# Patient Record
Sex: MALE | Race: WHITE | NOT HISPANIC OR LATINO
[De-identification: names, ages, dates, MRNs, and addresses within clinical notes are randomized per-mention and may not be internally consistent; named-entity substitution may affect disease eponyms.]

---

## 2020-10-07 ENCOUNTER — APPOINTMENT (OUTPATIENT)
Dept: INTERNAL MEDICINE | Facility: CLINIC | Age: 35
End: 2020-10-07
Payer: COMMERCIAL

## 2020-10-07 VITALS
HEART RATE: 80 BPM | WEIGHT: 241 LBS | HEIGHT: 68 IN | BODY MASS INDEX: 36.53 KG/M2 | SYSTOLIC BLOOD PRESSURE: 130 MMHG | OXYGEN SATURATION: 97 % | DIASTOLIC BLOOD PRESSURE: 85 MMHG

## 2020-10-07 DIAGNOSIS — Z82.49 FAMILY HISTORY OF ISCHEMIC HEART DISEASE AND OTHER DISEASES OF THE CIRCULATORY SYSTEM: ICD-10-CM

## 2020-10-07 DIAGNOSIS — Z83.3 FAMILY HISTORY OF DIABETES MELLITUS: ICD-10-CM

## 2020-10-07 DIAGNOSIS — Z87.891 PERSONAL HISTORY OF NICOTINE DEPENDENCE: ICD-10-CM

## 2020-10-07 PROCEDURE — 99385 PREV VISIT NEW AGE 18-39: CPT

## 2020-10-07 NOTE — HISTORY OF PRESENT ILLNESS
[FreeTextEntry1] : Routine physical and 35-year-old male [de-identified] : Assessment 35-year-old male with history of obesity. He's been losing weight of late and is now 5 feet 8 weight 239 pounds he previously was over 250. Past medical history none. Surgery none. Meds none. Allergies none. Social history former one third of a pack per day smoker but quit in September of 2019. He denies alcohol use. He works as a . Both parents have hypertension and diabetes. He has 4 siblings alive and well. Review of systems significant for a chronic complaint of right knee pain for more than one year. It is worse going up and down stairs and mostly along the lateral joint line.

## 2020-10-07 NOTE — ASSESSMENT
[FreeTextEntry1] : Patient with a recently had a right knee pain. Patient will be referred to orthopedist. Intensity obesity we discussed diet control and increasing aerobic exercise. Patient also will be going for routine labs and he will call me for the results

## 2020-10-07 NOTE — PHYSICAL EXAM
[No Acute Distress] : no acute distress [Well Developed] : well developed [Well-Appearing] : well-appearing [Normal Sclera/Conjunctiva] : normal sclera/conjunctiva [PERRL] : pupils equal round and reactive to light [EOMI] : extraocular movements intact [Normal Outer Ear/Nose] : the outer ears and nose were normal in appearance [Normal Oropharynx] : the oropharynx was normal [No JVD] : no jugular venous distention [No Lymphadenopathy] : no lymphadenopathy [Supple] : supple [Thyroid Normal, No Nodules] : the thyroid was normal and there were no nodules present [No Respiratory Distress] : no respiratory distress  [No Accessory Muscle Use] : no accessory muscle use [Clear to Auscultation] : lungs were clear to auscultation bilaterally [Normal Rate] : normal rate  [Regular Rhythm] : with a regular rhythm [Normal S1, S2] : normal S1 and S2 [No Murmur] : no murmur heard [No Carotid Bruits] : no carotid bruits [No Abdominal Bruit] : a ~M bruit was not heard ~T in the abdomen [No Varicosities] : no varicosities [Pedal Pulses Present] : the pedal pulses are present [No Edema] : there was no peripheral edema [No Palpable Aorta] : no palpable aorta [No Extremity Clubbing/Cyanosis] : no extremity clubbing/cyanosis [Soft] : abdomen soft [Non Tender] : non-tender [Non-distended] : non-distended [No Masses] : no abdominal mass palpated [No HSM] : no HSM [Normal Bowel Sounds] : normal bowel sounds [Normal Posterior Cervical Nodes] : no posterior cervical lymphadenopathy [Normal Anterior Cervical Nodes] : no anterior cervical lymphadenopathy [No CVA Tenderness] : no CVA  tenderness [No Spinal Tenderness] : no spinal tenderness [No Joint Swelling] : no joint swelling [Grossly Normal Strength/Tone] : grossly normal strength/tone [No Rash] : no rash [Coordination Grossly Intact] : coordination grossly intact [No Focal Deficits] : no focal deficits [Normal Gait] : normal gait [Normal Affect] : the affect was normal [Normal Insight/Judgement] : insight and judgment were intact [de-identified] : obese

## 2022-01-14 ENCOUNTER — APPOINTMENT (OUTPATIENT)
Dept: INTERNAL MEDICINE | Facility: CLINIC | Age: 37
End: 2022-01-14
Payer: COMMERCIAL

## 2022-01-14 VITALS
TEMPERATURE: 96.8 F | HEIGHT: 68 IN | HEART RATE: 102 BPM | DIASTOLIC BLOOD PRESSURE: 80 MMHG | SYSTOLIC BLOOD PRESSURE: 130 MMHG | BODY MASS INDEX: 35.61 KG/M2 | WEIGHT: 235 LBS | OXYGEN SATURATION: 98 %

## 2022-01-14 DIAGNOSIS — E66.9 OBESITY, UNSPECIFIED: ICD-10-CM

## 2022-01-14 DIAGNOSIS — Z00.00 ENCOUNTER FOR GENERAL ADULT MEDICAL EXAMINATION W/OUT ABNORMAL FINDINGS: ICD-10-CM

## 2022-01-14 PROCEDURE — 99395 PREV VISIT EST AGE 18-39: CPT
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
## 2025-03-31 ENCOUNTER — EMERGENCY (EMERGENCY)
Facility: HOSPITAL | Age: 40
LOS: 1 days | Discharge: HOME | End: 2025-03-31
Attending: EMERGENCY MEDICINE | Admitting: STUDENT IN AN ORGANIZED HEALTH CARE EDUCATION/TRAINING PROGRAM
Payer: MEDICAID

## 2025-03-31 VITALS
OXYGEN SATURATION: 96 % | DIASTOLIC BLOOD PRESSURE: 77 MMHG | HEART RATE: 80 BPM | SYSTOLIC BLOOD PRESSURE: 117 MMHG | HEIGHT: 68 IN | WEIGHT: 235.01 LBS | RESPIRATION RATE: 18 BRPM | TEMPERATURE: 99 F

## 2025-03-31 LAB
ADD ON TEST-SPECIMEN IN LAB: SIGNIFICANT CHANGE UP
ALBUMIN SERPL ELPH-MCNC: 4.2 G/DL — SIGNIFICANT CHANGE UP (ref 3.3–5)
ALP SERPL-CCNC: 66 U/L — SIGNIFICANT CHANGE UP (ref 40–120)
ALT FLD-CCNC: 47 U/L — HIGH (ref 4–41)
ANION GAP SERPL CALC-SCNC: 13 MMOL/L — SIGNIFICANT CHANGE UP (ref 7–14)
AST SERPL-CCNC: 35 U/L — SIGNIFICANT CHANGE UP (ref 4–40)
BACTERIA # UR AUTO: NEGATIVE /HPF — SIGNIFICANT CHANGE UP
BILIRUB SERPL-MCNC: 0.3 MG/DL — SIGNIFICANT CHANGE UP (ref 0.2–1.2)
BILIRUB UR-MCNC: NEGATIVE — SIGNIFICANT CHANGE UP
BUN SERPL-MCNC: 13 MG/DL — SIGNIFICANT CHANGE UP (ref 7–23)
CALCIUM SERPL-MCNC: 8.7 MG/DL — SIGNIFICANT CHANGE UP (ref 8.4–10.5)
CAST: 0 /LPF — SIGNIFICANT CHANGE UP (ref 0–4)
CHLORIDE SERPL-SCNC: 102 MMOL/L — SIGNIFICANT CHANGE UP (ref 98–107)
CK SERPL-CCNC: 147 U/L — SIGNIFICANT CHANGE UP (ref 30–200)
CO2 SERPL-SCNC: 21 MMOL/L — LOW (ref 22–31)
COLOR SPEC: YELLOW — SIGNIFICANT CHANGE UP
CREAT SERPL-MCNC: 0.88 MG/DL — SIGNIFICANT CHANGE UP (ref 0.5–1.3)
DIFF PNL FLD: NEGATIVE — SIGNIFICANT CHANGE UP
EGFR: 114 ML/MIN/1.73M2 — SIGNIFICANT CHANGE UP
EGFR: 114 ML/MIN/1.73M2 — SIGNIFICANT CHANGE UP
FLUAV AG NPH QL: SIGNIFICANT CHANGE UP
FLUBV AG NPH QL: SIGNIFICANT CHANGE UP
GLUCOSE SERPL-MCNC: 102 MG/DL — HIGH (ref 70–99)
GLUCOSE UR QL: NEGATIVE MG/DL — SIGNIFICANT CHANGE UP
HCT VFR BLD CALC: 40.9 % — SIGNIFICANT CHANGE UP (ref 39–50)
HGB BLD-MCNC: 14.4 G/DL — SIGNIFICANT CHANGE UP (ref 13–17)
KETONES UR-MCNC: ABNORMAL MG/DL
LEUKOCYTE ESTERASE UR-ACNC: NEGATIVE — SIGNIFICANT CHANGE UP
MCHC RBC-ENTMCNC: 35.2 G/DL — SIGNIFICANT CHANGE UP (ref 32–36)
MCV RBC AUTO: 86.8 FL — SIGNIFICANT CHANGE UP (ref 80–100)
NITRITE UR-MCNC: NEGATIVE — SIGNIFICANT CHANGE UP
NRBC # BLD AUTO: 0 K/UL — SIGNIFICANT CHANGE UP (ref 0–0)
NRBC # FLD: 0 K/UL — SIGNIFICANT CHANGE UP (ref 0–0)
NRBC BLD AUTO-RTO: 0 /100 WBCS — SIGNIFICANT CHANGE UP (ref 0–0)
PH UR: 6.5 — SIGNIFICANT CHANGE UP (ref 5–8)
PLATELET # BLD AUTO: 219 K/UL — SIGNIFICANT CHANGE UP (ref 150–400)
POTASSIUM SERPL-MCNC: 4 MMOL/L — SIGNIFICANT CHANGE UP (ref 3.5–5.3)
POTASSIUM SERPL-SCNC: 4 MMOL/L — SIGNIFICANT CHANGE UP (ref 3.5–5.3)
PROT SERPL-MCNC: 7.3 G/DL — SIGNIFICANT CHANGE UP (ref 6–8.3)
PROT UR-MCNC: 30 MG/DL
RBC # BLD: 4.71 M/UL — SIGNIFICANT CHANGE UP (ref 4.2–5.8)
RBC # FLD: 12.1 % — SIGNIFICANT CHANGE UP (ref 10.3–14.5)
RBC CASTS # UR COMP ASSIST: 2 /HPF — SIGNIFICANT CHANGE UP (ref 0–4)
RSV RNA NPH QL NAA+NON-PROBE: SIGNIFICANT CHANGE UP
SARS-COV-2 RNA SPEC QL NAA+PROBE: SIGNIFICANT CHANGE UP
SODIUM SERPL-SCNC: 136 MMOL/L — SIGNIFICANT CHANGE UP (ref 135–145)
SOURCE RESPIRATORY: SIGNIFICANT CHANGE UP
SP GR SPEC: 1.03 — HIGH (ref 1–1.03)
SQUAMOUS # UR AUTO: 0 /HPF — SIGNIFICANT CHANGE UP (ref 0–5)
UROBILINOGEN FLD QL: 1 MG/DL — SIGNIFICANT CHANGE UP (ref 0.2–1)
WBC # FLD AUTO: 5.03 K/UL — SIGNIFICANT CHANGE UP (ref 3.8–10.5)
WBC UR QL: 0 /HPF — SIGNIFICANT CHANGE UP (ref 0–5)

## 2025-03-31 RX ORDER — ACETAMINOPHEN 500 MG/5ML
975 LIQUID (ML) ORAL ONCE
Refills: 0 | Status: COMPLETED | OUTPATIENT
Start: 2025-03-31 | End: 2025-03-31

## 2025-03-31 RX ORDER — METOCLOPRAMIDE HCL 10 MG
10 TABLET ORAL ONCE
Refills: 0 | Status: COMPLETED | OUTPATIENT
Start: 2025-03-31 | End: 2025-03-31

## 2025-03-31 RX ADMIN — Medication 975 MILLIGRAM(S): at 18:05

## 2025-03-31 RX ADMIN — Medication 10 MILLIGRAM(S): at 19:58

## 2025-03-31 RX ADMIN — Medication 975 MILLIGRAM(S): at 18:47

## 2025-03-31 RX ADMIN — Medication 1000 MILLILITER(S): at 19:57

## 2025-03-31 NOTE — ED ADULT TRIAGE NOTE - CHIEF COMPLAINT QUOTE
Pt c/o fevers, chills and body aches  x1 week. Denies chest pain, SOB, palpitations, n/v/d. Last Tylenol 1000mg at 10am.  Denies past medical history.

## 2025-03-31 NOTE — ED CDU PROVIDER INITIAL DAY NOTE - PHYSICAL EXAMINATION
well appearing, NAd Temp 100  neck aupple heart s1s2,   lungs clear, abd soft nontender, extrem no edema   L1   from lower extrem  no sacral numbness

## 2025-03-31 NOTE — ED CDU PROVIDER INITIAL DAY NOTE - PROGRESS NOTE DETAILS
pt seen and examined. pt states he had diarrhea, resolved, now having headache and chills. pt febrile in CDU. given tylenol. blood cx sent in ED. no source of infection at this time. sent to cdu for MRI of L and T spine to r/o epidural abscess. pt with T and L spine mild ttp, states he has a history of T and L spine herniated discs. no neuro deficits on exam. pt well appearing. no recent travel, no sick contacts.

## 2025-03-31 NOTE — PROVIDER CONTACT NOTE (OTHER) - ASSESSMENT
Patient is A&Ox4, Patient is complaining of 8/8 headache. Patient denies pain, chest pain, shortness of breath, nausea, vomiting or dizziness.

## 2025-03-31 NOTE — ED PROVIDER NOTE - PROGRESS NOTE DETAILS
No documented frver, or symptoms except for chills, if RVP neg will recomment f/u with PMD in 2 days for reassessment

## 2025-03-31 NOTE — ED PROVIDER NOTE - NS ED ATTENDING STATEMENT MOD
Chest x ray neg for pneumonia.   Cont with abx, prednisone and inhaler- keep f/u appt- sooner if symptoms are worsening Attending Only

## 2025-03-31 NOTE — ED CDU PROVIDER INITIAL DAY NOTE - CLINICAL SUMMARY MEDICAL DECISION MAKING FREE TEXT BOX
39 yr old with FUO with back pain, in CDU for evaluation of Lumbar spine MRI for concern for spinal or epidural infection.

## 2025-03-31 NOTE — ED PROVIDER NOTE - CLINICAL SUMMARY MEDICAL DECISION MAKING FREE TEXT BOX
39-year-old male with 6 days fever chills muscle aches without obvious source of infection except for a couple of episodes of diarrhea a few days ago though symptoms chills persisting.  Physical exam is unremarkable HEENT unremarkable neck supple no adenopathy he lungs clear abdomen soft nontender extremities no edema.  Will get labs and chest x-ray look for signs of infection no nothing obvious on physical exam at this time. 39-year-old male with 6 days fever chills muscle aches without obvious source of infection except for a couple of episodes of diarrhea a few days ago though symptoms chills persisting.  Physical exam is unremarkable HEENT unremarkable neck supple no adenopathy he lungs clear abdomen soft nontender extremities no edema.  Will get labs and chest x-ray look for signs of infection no nothing obvious on physical exam at this time.   On reevaluation, patient c/o back pain, concern for fuo with possible epidural abscess, no neuro deficit

## 2025-03-31 NOTE — ED PROVIDER NOTE - PHYSICAL EXAMINATION
patient well-appearing no acute distress afebrile, though took Tylenol 2 hours ago,   HEENT unremarkable throat negative   neck supple no adenopathy noted   heart sounds S1-S2 no murmurs gallops or rubs   lungs clear   abdomen soft nontender   extremities no edema pulses intact bilaterally skin no rashes   no obvious muscular  tenderness

## 2025-03-31 NOTE — ED PROVIDER NOTE - OBJECTIVE STATEMENT
39-year-old male with a past medical history complaining of 6 days fever and chills taking Tylenol with some relief.  Denies headache sore throat runny nose cough abdominal pain had a couple of days of diarrhea which stopped and 1 bout of vomiting today after taking Jodi-[place]. 39-year-old male with a past medical history complaining of 6 days fever and chills taking Tylenol with some relief.  Denies headache sore throat runny nose cough abdominal pain had a couple of days of diarrhea which stopped and 1 bout of vomiting today after taking Jodi-[place]. Also c/o back pain severe, denies IV drug use

## 2025-03-31 NOTE — ED CDU PROVIDER INITIAL DAY NOTE - OBJECTIVE STATEMENT
39 yr old male with fever without any symptoms @ rigors, temp 100 after tylenol today. c/o back pain, No cough, ha. runny nose, n/v, had diarrhea 1 day last week. PE and labs unremarkable, no point of infection noted, no travel hx, no drugs or alcohol, except severe back pain.

## 2025-04-01 ENCOUNTER — NON-APPOINTMENT (OUTPATIENT)
Age: 40
End: 2025-04-01

## 2025-04-01 VITALS
OXYGEN SATURATION: 100 % | DIASTOLIC BLOOD PRESSURE: 71 MMHG | SYSTOLIC BLOOD PRESSURE: 114 MMHG | HEART RATE: 71 BPM | TEMPERATURE: 98 F | RESPIRATION RATE: 16 BRPM

## 2025-04-01 LAB
BASOPHILS # BLD AUTO: 0.02 K/UL — SIGNIFICANT CHANGE UP (ref 0–0.2)
BASOPHILS NFR BLD AUTO: 0.4 % — SIGNIFICANT CHANGE UP (ref 0–2)
EOSINOPHIL # BLD AUTO: 0.02 K/UL — SIGNIFICANT CHANGE UP (ref 0–0.5)
EOSINOPHIL NFR BLD AUTO: 0.4 % — SIGNIFICANT CHANGE UP (ref 0–6)
HCT VFR BLD CALC: 40.6 % — SIGNIFICANT CHANGE UP (ref 39–50)
HGB BLD-MCNC: 14 G/DL — SIGNIFICANT CHANGE UP (ref 13–17)
HIV 1+2 AB+HIV1 P24 AG SERPL QL IA: SIGNIFICANT CHANGE UP
IANC: 2.77 K/UL — SIGNIFICANT CHANGE UP (ref 1.8–7.4)
IMM GRANULOCYTES NFR BLD AUTO: 0.2 % — SIGNIFICANT CHANGE UP (ref 0–0.9)
LACTATE SERPL-SCNC: 1.1 MMOL/L — SIGNIFICANT CHANGE UP (ref 0.5–2)
LYMPHOCYTES # BLD AUTO: 2.07 K/UL — SIGNIFICANT CHANGE UP (ref 1–3.3)
LYMPHOCYTES # BLD AUTO: 37.8 % — SIGNIFICANT CHANGE UP (ref 13–44)
MCHC RBC-ENTMCNC: 34.5 G/DL — SIGNIFICANT CHANGE UP (ref 32–36)
MCV RBC AUTO: 87.1 FL — SIGNIFICANT CHANGE UP (ref 80–100)
MONOCYTES # BLD AUTO: 0.58 K/UL — SIGNIFICANT CHANGE UP (ref 0–0.9)
MONOCYTES NFR BLD AUTO: 10.6 % — SIGNIFICANT CHANGE UP (ref 2–14)
NEUTROPHILS # BLD AUTO: 2.77 K/UL — SIGNIFICANT CHANGE UP (ref 1.8–7.4)
NEUTROPHILS NFR BLD AUTO: 50.6 % — SIGNIFICANT CHANGE UP (ref 43–77)
NRBC # BLD AUTO: 0 K/UL — SIGNIFICANT CHANGE UP (ref 0–0)
NRBC BLD AUTO-RTO: 0 /100 WBCS — SIGNIFICANT CHANGE UP (ref 0–0)
PLATELET # BLD AUTO: 228 K/UL — SIGNIFICANT CHANGE UP (ref 150–400)
RBC # BLD: 4.66 M/UL — SIGNIFICANT CHANGE UP (ref 4.2–5.8)
RBC # FLD: 12.4 % — SIGNIFICANT CHANGE UP (ref 10.3–14.5)
WBC # BLD: 5.47 K/UL — SIGNIFICANT CHANGE UP (ref 3.8–10.5)
WBC # FLD AUTO: 5.47 K/UL — SIGNIFICANT CHANGE UP (ref 3.8–10.5)

## 2025-04-01 RX ORDER — IBUPROFEN 200 MG
600 TABLET ORAL ONCE
Refills: 0 | Status: COMPLETED | OUTPATIENT
Start: 2025-04-01 | End: 2025-04-01

## 2025-04-01 RX ADMIN — Medication 600 MILLIGRAM(S): at 02:15

## 2025-04-01 RX ADMIN — Medication 600 MILLIGRAM(S): at 01:15

## 2025-04-01 NOTE — ED CDU PROVIDER DISPOSITION NOTE - NSFOLLOWUPINSTRUCTIONS_ED_ALL_ED_FT
You were seen in our department for Fevers of unknown origin  your labs, and MRIs are negative for infection, however you do have significant multi-level degenerative disc disease/herniations, see your primary doctor for a spine specialist regarding these incidental findings.  Follow up with your PMD in 48-72 hours for further monitoring.  Follow up with INfectious disease within 1 week for further evaluation.  if you develop any chest pain, dizziness, high fevers, weakness, numbness, tingling, vision changes, or any worsening symptoms return to our ED for evaluation.

## 2025-04-01 NOTE — ED CDU PROVIDER DISPOSITION NOTE - PATIENT PORTAL LINK FT
You can access the FollowMyHealth Patient Portal offered by [facility] by registering at the following website: [email or web address]. By joining [facility]’s FollowMyHealth portal, you will also be able to view your health information using other applications (apps) compatible with our system.

## 2025-04-01 NOTE — ED CDU PROVIDER DISPOSITION NOTE - CLINICAL COURSE
40 YO M with PMH Herniated disc who presented to ED with fever, bodyaches, chills x 5 days. TMAX 101 at home. In ED, pt without leukocytosis, RVP negative, UA normal, CXR clear. Blood cultures were obtained. Pt placed in CDU for monitoring temperature curve, MRI imaging, frequent reassessments. repeat CBC this am w/ diff remains normal. pt afebrile, very well/nontoxic appearing. HIV negative. will dc w/ infectious disease f/u, discharge lounge notified.

## 2025-04-01 NOTE — ED CDU PROVIDER DISPOSITION NOTE - NSFOLLOWUPCLINICS_GEN_ALL_ED_FT
[facility] - Infectious Disease  Infectious Disease  [address], [place] [address]  Phone: [phone]  Fax:

## 2025-04-01 NOTE — ED CDU PROVIDER SUBSEQUENT DAY NOTE - ATTENDING APP SHARED VISIT CONTRIBUTION OF CARE
40 YO M with PMH Herniated disc who presented to ED with fever, bodyaches, chills x 5 days. TMAX 101 at home. In ED, pt without leukocytosis, RVP negative, UA normal, CXR clear. Blood cultures were obtained. Pt placed in CDU for monitoring temperature curve, MRI imaging, frequent reassessments.  agree with above: 37yo M with 6 days of fevers, body aches, chills,   placed in cdu for MRI spine ro epidural abscess, MRI negative, will dc home with ID arabella velasco for FUO if fevers persist

## 2025-04-01 NOTE — ED CDU PROVIDER SUBSEQUENT DAY NOTE - HISTORY
Pt is a 40 YO M with PMH Herniated disc who presented to ED with fever, bodyaches, chills x 5 days. TMAX 101 at home. In ED, pt without leukocytosis, RVP negative, UA normal, CXR clear. Blood cultures were obtained. Pt placed in CDU for monitoring temperature curve, MRI imaging, frequent reassessments.   In the interim- pt reports still with bodyaches, had temp this afternoon 100.7 Pt is a 40 YO M with PMH Herniated disc who presented to ED with fever, bodyaches, chills x 5 days. TMAX 101 at home. In ED, pt without leukocytosis, RVP negative, UA normal, CXR clear. Blood cultures were obtained. Pt placed in CDU for monitoring temperature curve, MRI imaging, frequent reassessments. repeat CBC this am w/ diff remains normal. pt afebrile, very well/nontoxic appearing. HIV negative. will dc w/ infectious disease f/u, discharge lounge notified. Pt is a 38 YO M with PMH Herniated disc who presented to ED with fever, bodyaches, chills x 5 days. TMAX 101 at home. In ED, pt without leukocytosis, RVP negative, UA normal, CXR clear. Blood cultures were obtained. Pt placed in CDU for monitoring temperature curve, MRI imaging, frequent reassessments.

## 2025-04-01 NOTE — ED CDU PROVIDER SUBSEQUENT DAY NOTE - CLINICAL SUMMARY MEDICAL DECISION MAKING FREE TEXT BOX
Pt is a 40 YO M with PMH Herniated disc who presented to ED with fever, bodyaches, chills x 5 days. TMAX 101 at home. In ED, pt without leukocytosis, RVP negative, UA normal, CXR clear. Blood cultures were obtained. Pt placed in CDU for monitoring temperature curve, MRI imaging, frequent reassessments.

## 2025-04-01 NOTE — ED CDU PROVIDER DISPOSITION NOTE - ATTENDING CONTRIBUTION TO CARE
Pt placed in cdu for MRI to ro epidural abscess given 6 days of fevers and back pain, MRI negativem, remainder or labs wnl, FUO, ok for dc home and fu with ID if fevers persist  pt very well appearing

## 2025-04-05 LAB
CULTURE RESULTS: SIGNIFICANT CHANGE UP
CULTURE RESULTS: SIGNIFICANT CHANGE UP
SPECIMEN SOURCE: SIGNIFICANT CHANGE UP

## 2025-06-09 NOTE — ED ADULT NURSE NOTE - CAS TRG GEN SKIN CONDITION
Warm/Dry